# Patient Record
Sex: FEMALE | Race: WHITE | NOT HISPANIC OR LATINO | Employment: FULL TIME | URBAN - METROPOLITAN AREA
[De-identification: names, ages, dates, MRNs, and addresses within clinical notes are randomized per-mention and may not be internally consistent; named-entity substitution may affect disease eponyms.]

---

## 2017-04-05 ENCOUNTER — HOSPITAL ENCOUNTER (OUTPATIENT)
Dept: LAB | Facility: MEDICAL CENTER | Age: 37
End: 2017-04-05
Attending: INTERNAL MEDICINE
Payer: MEDICAID

## 2017-04-05 LAB
T4 FREE SERPL-MCNC: 1.01 NG/DL (ref 0.53–1.43)
TSH SERPL DL<=0.005 MIU/L-ACNC: 15.45 UIU/ML (ref 0.3–3.7)

## 2017-04-05 PROCEDURE — 84439 ASSAY OF FREE THYROXINE: CPT

## 2017-04-05 PROCEDURE — 84443 ASSAY THYROID STIM HORMONE: CPT

## 2017-04-05 PROCEDURE — 36415 COLL VENOUS BLD VENIPUNCTURE: CPT

## 2017-07-13 ENCOUNTER — HOSPITAL ENCOUNTER (OUTPATIENT)
Dept: LAB | Facility: MEDICAL CENTER | Age: 37
End: 2017-07-13
Attending: INTERNAL MEDICINE
Payer: MEDICAID

## 2017-07-13 LAB
T4 FREE SERPL-MCNC: 0.71 NG/DL (ref 0.53–1.43)
TSH SERPL DL<=0.005 MIU/L-ACNC: 26.23 UIU/ML (ref 0.3–3.7)

## 2017-07-13 PROCEDURE — 84443 ASSAY THYROID STIM HORMONE: CPT

## 2017-07-13 PROCEDURE — 84439 ASSAY OF FREE THYROXINE: CPT

## 2017-07-13 PROCEDURE — 36415 COLL VENOUS BLD VENIPUNCTURE: CPT

## 2018-07-03 ENCOUNTER — HOSPITAL ENCOUNTER (OUTPATIENT)
Dept: LAB | Facility: MEDICAL CENTER | Age: 38
End: 2018-07-03
Attending: INTERNAL MEDICINE
Payer: MEDICAID

## 2018-07-03 LAB
T4 FREE SERPL-MCNC: 0.74 NG/DL (ref 0.53–1.43)
TSH SERPL DL<=0.005 MIU/L-ACNC: 18.24 UIU/ML (ref 0.38–5.33)

## 2018-07-03 PROCEDURE — 84443 ASSAY THYROID STIM HORMONE: CPT

## 2018-07-03 PROCEDURE — 80053 COMPREHEN METABOLIC PANEL: CPT

## 2018-07-03 PROCEDURE — 36415 COLL VENOUS BLD VENIPUNCTURE: CPT

## 2018-07-03 PROCEDURE — 84439 ASSAY OF FREE THYROXINE: CPT

## 2018-07-04 LAB
ALBUMIN SERPL BCP-MCNC: 4.3 G/DL (ref 3.2–4.9)
ALBUMIN/GLOB SERPL: 1.7 G/DL
ALP SERPL-CCNC: 60 U/L (ref 30–99)
ALT SERPL-CCNC: 9 U/L (ref 2–50)
ANION GAP SERPL CALC-SCNC: 8 MMOL/L (ref 0–11.9)
AST SERPL-CCNC: 27 U/L (ref 12–45)
BILIRUB SERPL-MCNC: 0.5 MG/DL (ref 0.1–1.5)
BUN SERPL-MCNC: 11 MG/DL (ref 8–22)
CALCIUM SERPL-MCNC: 8.7 MG/DL (ref 8.5–10.5)
CHLORIDE SERPL-SCNC: 106 MMOL/L (ref 96–112)
CO2 SERPL-SCNC: 23 MMOL/L (ref 20–33)
CREAT SERPL-MCNC: 0.73 MG/DL (ref 0.5–1.4)
GLOBULIN SER CALC-MCNC: 2.5 G/DL (ref 1.9–3.5)
GLUCOSE SERPL-MCNC: 81 MG/DL (ref 65–99)
POTASSIUM SERPL-SCNC: 4.7 MMOL/L (ref 3.6–5.5)
PROT SERPL-MCNC: 6.8 G/DL (ref 6–8.2)
SODIUM SERPL-SCNC: 137 MMOL/L (ref 135–145)

## 2018-10-21 ENCOUNTER — HOSPITAL ENCOUNTER (EMERGENCY)
Facility: MEDICAL CENTER | Age: 38
End: 2018-10-21
Attending: EMERGENCY MEDICINE
Payer: MEDICAID

## 2018-10-21 VITALS
RESPIRATION RATE: 16 BRPM | WEIGHT: 236.55 LBS | HEIGHT: 62 IN | SYSTOLIC BLOOD PRESSURE: 128 MMHG | DIASTOLIC BLOOD PRESSURE: 68 MMHG | TEMPERATURE: 97.9 F | OXYGEN SATURATION: 98 % | BODY MASS INDEX: 43.53 KG/M2 | HEART RATE: 65 BPM

## 2018-10-21 DIAGNOSIS — S39.012A LUMBOSACRAL STRAIN, INITIAL ENCOUNTER: ICD-10-CM

## 2018-10-21 DIAGNOSIS — M54.50 ACUTE BILATERAL LOW BACK PAIN WITHOUT SCIATICA: ICD-10-CM

## 2018-10-21 PROCEDURE — A9270 NON-COVERED ITEM OR SERVICE: HCPCS | Performed by: EMERGENCY MEDICINE

## 2018-10-21 PROCEDURE — 700111 HCHG RX REV CODE 636 W/ 250 OVERRIDE (IP): Performed by: EMERGENCY MEDICINE

## 2018-10-21 PROCEDURE — 700102 HCHG RX REV CODE 250 W/ 637 OVERRIDE(OP): Performed by: EMERGENCY MEDICINE

## 2018-10-21 PROCEDURE — 96372 THER/PROPH/DIAG INJ SC/IM: CPT

## 2018-10-21 PROCEDURE — 99284 EMERGENCY DEPT VISIT MOD MDM: CPT

## 2018-10-21 RX ORDER — METHYLPREDNISOLONE 4 MG/1
TABLET ORAL
Qty: 1 KIT | Refills: 0 | Status: SHIPPED | OUTPATIENT
Start: 2018-10-21 | End: 2019-10-04

## 2018-10-21 RX ORDER — KETOROLAC TROMETHAMINE 30 MG/ML
30 INJECTION, SOLUTION INTRAMUSCULAR; INTRAVENOUS ONCE
Status: COMPLETED | OUTPATIENT
Start: 2018-10-21 | End: 2018-10-21

## 2018-10-21 RX ORDER — IBUPROFEN 600 MG/1
600 TABLET ORAL EVERY 6 HOURS PRN
Qty: 20 TAB | Refills: 0 | Status: SHIPPED | OUTPATIENT
Start: 2018-10-21 | End: 2019-10-04

## 2018-10-21 RX ORDER — METHYLPREDNISOLONE SODIUM SUCCINATE 125 MG/2ML
125 INJECTION, POWDER, LYOPHILIZED, FOR SOLUTION INTRAMUSCULAR; INTRAVENOUS ONCE
Status: COMPLETED | OUTPATIENT
Start: 2018-10-21 | End: 2018-10-21

## 2018-10-21 RX ORDER — METHOCARBAMOL 500 MG/1
500 TABLET, FILM COATED ORAL ONCE
Status: COMPLETED | OUTPATIENT
Start: 2018-10-21 | End: 2018-10-21

## 2018-10-21 RX ORDER — METHOCARBAMOL 500 MG/1
500 TABLET, FILM COATED ORAL EVERY 6 HOURS PRN
Qty: 20 TAB | Refills: 0 | Status: SHIPPED | OUTPATIENT
Start: 2018-10-21 | End: 2019-10-04

## 2018-10-21 RX ADMIN — METHOCARBAMOL 500 MG: 500 TABLET ORAL at 08:59

## 2018-10-21 RX ADMIN — METHYLPREDNISOLONE SODIUM SUCCINATE 125 MG: 125 INJECTION, POWDER, FOR SOLUTION INTRAMUSCULAR; INTRAVENOUS at 08:59

## 2018-10-21 RX ADMIN — KETOROLAC TROMETHAMINE 30 MG: 30 INJECTION, SOLUTION INTRAMUSCULAR at 09:00

## 2018-10-21 ASSESSMENT — PAIN SCALES - GENERAL
PAINLEVEL_OUTOF10: 7
PAINLEVEL_OUTOF10: 4

## 2018-10-21 NOTE — ED NOTES
Discharge instructions given to pt including follow up with pcp or returning if no improvement of symptoms or to return if worse. Prescription x 3 provided to pt. Questions answered by RN. Denies any new complaints. Discharged w/stable vitals and able to ambulate w/steady gait. Pt verbalized relief of pain. Instructed no driving no drinking alcohol while on prescribed muscle relaxer.

## 2018-10-21 NOTE — DISCHARGE INSTRUCTIONS
Follow-up with primary care 1-2 days for reevaluation, medication management and close blood pressure monitoring.  Follow-up for MRI as scheduled later this week.      Medrol Dosepak as directed.  Motrin every 6 hours as needed for back pain.  Robaxin every 6 hours as needed for back pain or spasm.    Slow stretching range of motion exercises recommended.  Otherwise activity as tolerated.    Return to the emergency department for persistent worsening back pain, lower extremity paresthesias or weakness, incontinence or urinary retention, fever, vomiting or other new concerns.

## 2018-10-21 NOTE — ED TRIAGE NOTES
"Pt c/o low back pain. Pt has already seen primary MD and has outpt MRI scheduled but pain is \"too much\". Pt ambulatory.   "

## 2018-10-21 NOTE — ED PROVIDER NOTES
"ED Provider Note    CHIEF COMPLAINT  Chief Complaint   Patient presents with   • Low Back Pain       HPI  Lynette Joya is a 38 y.o. female who ambulates to the emergency department for low back pain.  Patient describes progressive low back pain over 2 weeks, now \"pressure\" even at rest but worse, sharp, tight with movement and activity.  No extremity weakness or paresthesias.  No incontinence or urinary retention.  No abdominal pain or flank pain.  No fever chills.  No nausea or vomiting.  No diarrhea or bloody stools.  No IV drug use.  No history of similar symptoms.  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    Patient did see her primary care physician earlier this week, tramadol given for discomfort although this patient states this is been ineffective but admittedly has not taken many of them.  Patient states she did get Toradol in the office but the medication wore off by the next morning.  She does have an appointment for MRI this coming Friday.    REVIEW OF SYSTEMS  See HPI for further details. All other systems are negative.     PAST MEDICAL HISTORY   has a past medical history of Anesthesia; Dental disorder; Depression; Graves disease (12/11/2015); Headache(784.0); Heart burn; Heart murmur; Hypertension; Increased BMI; Migraine; Ovarian cyst (2010); Pre-eclampsia; Sleep apnea; and Snoring.    SOCIAL HISTORY  Social History     Social History Main Topics   • Smoking status: Current Some Day Smoker     Years: 2.00     Types: Cigarettes   • Smokeless tobacco: Never Used      Comment: currently smoking 2-3 cigarettes per week.   • Alcohol use Yes      Comment: socially   • Drug use: No   • Sexual activity: Yes     Partners: Male     Birth control/ protection: Pill      Comment: Unplanned pregancy.       SURGICAL HISTORY   has a past surgical history that includes d & c (2006); dilation and evacuation (8/21/2013); " "cryocautery of cervix (2002); hysteroscopy essure coil (12/3/2013); and thyroidectomy total (12/11/2015).    CURRENT MEDICATIONS  Home Medications    **Home medications have not yet been reviewed for this encounter**         ALLERGIES  Allergies   Allergen Reactions   • Nkda [No Known Drug Allergy]        PHYSICAL EXAM  VITAL SIGNS: /68   Pulse 75   Temp 36.6 °C (97.9 °F) (Temporal)   Resp 16   Ht 1.575 m (5' 2\")   Wt 107.3 kg (236 lb 8.9 oz)   SpO2 98%   BMI 43.27 kg/m²   Pulse ox interpretation: I interpret this pulse ox as normal.  Constitutional: Alert.  Mild distress secondary discomfort per  HENT: Normocephalic, atraumatic. Bilateral external ears normal, Nose normal. Moist mucous membranes.    Eyes: Conjunctiva normal.   Neck: Normal range of motion, Supple  Lymphatic: No lymphadenopathy noted.   Cardiovascular: Normal peripheral perfusion.  Thorax & Lungs: Nonlabored respirations.  Abdomen: Soft, non-distended, non-tender to palpation. No palpable or pulsatile masses.  No CVA tenderness to percussion per  Skin: Warm, Dry, No erythema, No rash.   Musculoskeletal: Good range of motion in all major joints.  Neurologic: Alert and oriented x4.  Speech clear and cohesive.  5 out of 5 strength in bilateral lower extremities, hip, knee, ankle and toe flexion extension intact.  Straight leg raise intact but with discomfort bilaterally.  2+ patellar DTR.  2+ DP.  Sensation intact light touch, no saddle anesthesia.  Patient bears weight and ambulate independently.  Psychiatric: Affect normal, Judgment normal, Mood normal.       COURSE & MEDICAL DECISION MAKING  Nursing notes and vital signs were reviewed. (See chart for details)  The patients records were reviewed, history was obtained from the patient;     ED evaluation was consistent with lumbosacral strain, no evidence for radiculopathy at this time.  No history or clinical evidence to suggest cauda equina, epidural abscess or other spinal cord " compression syndrome.  Patient did get significant relief after Toradol, Solu-Medrol, Robaxin in the emergency department.  Vital signs are stable without fever tachycardia.  Patient ambulates independently.    Patient is stable for discharge at this time, anticipatory guidance provided, Medrol Dosepak as directed, ibuprofen and Robaxin as needed for pain or spasm, close follow-up is encouraged with primary care this week, MRI is scheduled, and strict ED return instructions have been detailed. Patient is agreeable to the disposition and plan.    Patient's blood pressure was elevated in the emergency department, and has been referred to primary care for close monitoring.      FINAL IMPRESSION  (M54.5) Acute bilateral low back pain without sciatica  (S39.012A) Lumbosacral strain, initial encounter      Electronically signed by: Sarah Cristobal, 10/21/2018 10:02 AM      This dictation was created using voice recognition software. The accuracy of the dictation is limited to the abilities of the software. I expect there may be some errors of grammar and possibly content. The nursing notes were reviewed and certain aspects of this information were incorporated into this note.

## 2018-10-21 NOTE — ED NOTES
Ambulated to room w/steady gait. C/o pain in her lower back/neck describes as shooting pain all the way down to both her lower extremities. Denies  Numbness/tingling denies bowel/bladder incontinence. Call light within reach. Instructed to notify staff for any assistance.

## 2019-07-08 ENCOUNTER — APPOINTMENT (OUTPATIENT)
Dept: RADIOLOGY | Facility: MEDICAL CENTER | Age: 39
End: 2019-07-08
Attending: EMERGENCY MEDICINE
Payer: MEDICAID

## 2019-07-08 ENCOUNTER — HOSPITAL ENCOUNTER (EMERGENCY)
Facility: MEDICAL CENTER | Age: 39
End: 2019-07-08
Attending: EMERGENCY MEDICINE
Payer: MEDICAID

## 2019-07-08 VITALS
OXYGEN SATURATION: 93 % | HEART RATE: 86 BPM | RESPIRATION RATE: 16 BRPM | BODY MASS INDEX: 46.49 KG/M2 | SYSTOLIC BLOOD PRESSURE: 134 MMHG | TEMPERATURE: 98.1 F | HEIGHT: 62 IN | WEIGHT: 252.65 LBS | DIASTOLIC BLOOD PRESSURE: 85 MMHG

## 2019-07-08 DIAGNOSIS — S93.401A SPRAIN OF RIGHT ANKLE, UNSPECIFIED LIGAMENT, INITIAL ENCOUNTER: ICD-10-CM

## 2019-07-08 PROCEDURE — 700102 HCHG RX REV CODE 250 W/ 637 OVERRIDE(OP): Performed by: EMERGENCY MEDICINE

## 2019-07-08 PROCEDURE — 99284 EMERGENCY DEPT VISIT MOD MDM: CPT

## 2019-07-08 PROCEDURE — 73610 X-RAY EXAM OF ANKLE: CPT | Mod: RT

## 2019-07-08 PROCEDURE — A9270 NON-COVERED ITEM OR SERVICE: HCPCS | Performed by: EMERGENCY MEDICINE

## 2019-07-08 PROCEDURE — 302874 HCHG BANDAGE ACE 2 OR 3""

## 2019-07-08 RX ORDER — NAPROXEN 500 MG/1
500 TABLET ORAL 2 TIMES DAILY WITH MEALS
Qty: 60 TAB | Refills: 0 | Status: SHIPPED | OUTPATIENT
Start: 2019-07-08 | End: 2019-10-04

## 2019-07-08 RX ORDER — NAPROXEN 250 MG/1
500 TABLET ORAL ONCE
Status: COMPLETED | OUTPATIENT
Start: 2019-07-08 | End: 2019-07-08

## 2019-07-08 RX ADMIN — NAPROXEN 500 MG: 250 TABLET ORAL at 09:06

## 2019-07-08 NOTE — ED NOTES
"D/C instn given Rest Ice NSAIDS Pt has her own crutches D/C via W/C Stable improved condn \" ankle feels better in wrap CSM intact distal to ace  To F/U with ortho prn  "

## 2019-07-08 NOTE — ED PROVIDER NOTES
"ED Provider Note    CHIEF COMPLAINT  Chief Complaint   Patient presents with   • Ankle Pain     Right lateral ankle, atraumatic. Started yesterday, pt reports the pain kept her up all night. CMS intact. Difficulty bearing weight        HPI  Lynette Joya is a 39 y.o. female who presents to the ED complaining of right ankle pain.  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  Patient denies paresthesias nausea vomiting calf pain or any other symptoms presents for evaluation.    REVIEW OF SYSTEMS  See HPI for further details. All other systems are negative.     PAST MEDICAL HISTORY  Past Medical History:   Diagnosis Date   • Anesthesia     PONV and or crying   • Dental disorder    • Depression     \"post partum\"   • Graves disease 12/11/2015   • Headache(784.0)     onset 2 yrs ago, severe (migraines) takes ASA and lies down to resolve.     • Heart burn    • Heart murmur    • Hypertension     Stopped taking meds 1.5 month ago.   • Increased BMI    • Migraine    • Ovarian cyst 2010    right side   • Pre-eclampsia     Dx'd with pree clapmsia during last 3 pregnancies.   • Sleep apnea     had sleep study done, treatment recommended but not \"followed through\"   • Snoring        FAMILY HISTORY  Family History   Problem Relation Age of Onset   • Cancer Paternal Grandfather    • Diabetes Paternal Grandfather    • Hypertension Paternal Grandfather    • Stroke Unknown      Patient's family history has been discussed and is been found to be noncontributory to his present illness  SOCIAL HISTORY  Social History     Social History   • Marital status:      Spouse name: N/A   • Number of children: N/A   • Years of education: N/A     Social History Main Topics   • Smoking " status: Current Some Day Smoker     Years: 2.00     Types: Cigarettes   • Smokeless tobacco: Never Used      Comment: currently smoking 2-3 cigarettes per week.   • Alcohol use Yes      Comment: socially   • Drug use: No   • Sexual activity: Yes     Partners: Male     Birth control/ protection: Pill      Comment: Unplanned pregancy.     Other Topics Concern   • Not on file     Social History Narrative   • No narrative on file              SURGICAL HISTORY  Past Surgical History:   Procedure Laterality Date   • THYROIDECTOMY TOTAL  12/11/2015    Procedure: THYROIDECTOMY subtotal poss  TOTAL  , bilateral NIMS recurrent laryngeal; nerve monitor ;  Surgeon: Mariah Gee M.D.;  Location: SURGERY [facility];  Service:    • HYSTEROSCOPY ESSURE COIL  12/3/2013    Performed by Yovana Damon M.D. at SURGERY [facility]   • DILATION AND EVACUATION  8/21/2013    Performed by Yovana Damon M.D. at SURGERY SAME DAY [facility]   • D & C  2006   • PB CRYOCAUTERY OF CERVIX  2002       CURRENT MEDICATIONS   Home Medications     Reviewed by Adriana Chacon RCarrilloNCarrillo (Registered Nurse) on 07/08/19 at 0813  Med List Status: Not Addressed   Medication Last Dose Status   Calcium Carbonate Antacid (TUMS ULTRA 1000) 1000 MG Chew Tab  Active   fluoxetine (PROZAC) 40 MG capsule  Active   hydrocodone-acetaminophen (NORCO) 5-325 MG Tab per tablet  Active   ibuprofen (MOTRIN) 600 MG Tab  Active   levothyroxine (SYNTHROID) 125 MCG Tab  Active   methocarbamol (ROBAXIN) 500 MG Tab  Active   MethylPREDNISolone (MEDROL DOSEPAK) 4 MG Tablet Therapy Pack  Active   metoprolol SR (TOPROL XL) 25 MG TB24  Active   naproxen (NAPROSYN) 500 MG Tab  Active   potassium chloride CR (K-DUR) 10 MEQ tablet  Active   tramadol (ULTRAM) 50 MG Tab  Active                ALLERGIES   Allergies   Allergen Reactions   • Nkda [No Known Drug Allergy]        PHYSICAL EXAM  VITAL SIGNS: /85   Pulse 86   Temp 36.7 °C (98.1 °F) (Temporal)   Resp 16   Ht 1.575  "m (5' 2\")   Wt 114.6 kg (252 lb 10.4 oz)   SpO2 93%   BMI 46.21 kg/m²    Pulse Ox interpretation nonhypoxic    Constitutional: Well developed, Well nourished, No acute distress, Non-toxic appearance.   Skin: Warm, Dry, No erythema,   Musculoskeletal: Intact distal pulses, no clubbing, no cyanosis, no edema patient is tender about the lateral malleolus as well as the peroneal tendon region of the posterior malleolor region.  The ankle itself there is no edema there is no other tenderness in the ankle region.  There is no deformities distal pulses are grossly intact no significant bony tenderness.  Neurologic: Alert & oriented x 3, Normal motor function, Normal sensory function, No focal deficits noted.     RADIOLOGY/PROCEDURES  DX-ANKLE 3+ VIEWS RIGHT   Final Result      Soft tissue swelling without acute or subacute bony abnormality identified            COURSE & MEDICAL DECISION MAKING  Pertinent Labs & Imaging studies reviewed. (See chart for details)  Patient presents for evaluation.  Clinically I do feel that this is a peroneus muscular strain and ankle sprain to the right ankle.  She does not have any actual joint tenderness or other bony tenderness.  X-ray has the above findings.  At this point I recommended range of motion exercises crutches for 1 week limited walking and anti-inflammatory treatment.  Patient is to follow-up with orthopedics in 1 week if there is any continued symptoms return as needed.    FINAL IMPRESSION  1. Sprain of right ankle, unspecified ligament, initial encounter           The patient will return for new or worsening symptoms and is stable at the time of discharge.    The patient is referred to a primary physician for blood pressure management, diabetic screening, and for all other preventative health concerns.        DISPOSITION:  Patient will be discharged home in stable condition.    FOLLOW UP:  Kannan Ragland M.D.  [address] [place] " [id number]  [phone]    Schedule an appointment as soon as possible for a visit in 1 week  For further evaluation, Return if any symptoms worsen      OUTPATIENT MEDICATIONS:  Discharge Medication List as of 7/8/2019  9:22 AM      START taking these medications    Details   naproxen (NAPROSYN) 500 MG Tab Take 1 Tab by mouth 2 times a day, with meals., Disp-60 Tab, R-0, Print Rx Paper                 Electronically signed by: Polo Ram, 7/8/2019 8:24 AM

## 2019-10-04 ENCOUNTER — APPOINTMENT (OUTPATIENT)
Dept: RADIOLOGY | Facility: MEDICAL CENTER | Age: 39
End: 2019-10-04
Attending: EMERGENCY MEDICINE
Payer: MEDICAID

## 2019-10-04 ENCOUNTER — HOSPITAL ENCOUNTER (EMERGENCY)
Facility: MEDICAL CENTER | Age: 39
End: 2019-10-04
Attending: EMERGENCY MEDICINE
Payer: MEDICAID

## 2019-10-04 VITALS
OXYGEN SATURATION: 97 % | RESPIRATION RATE: 20 BRPM | SYSTOLIC BLOOD PRESSURE: 129 MMHG | WEIGHT: 261.91 LBS | HEIGHT: 62 IN | HEART RATE: 68 BPM | DIASTOLIC BLOOD PRESSURE: 67 MMHG | TEMPERATURE: 96.3 F | BODY MASS INDEX: 48.2 KG/M2

## 2019-10-04 DIAGNOSIS — L02.211 ABSCESS OF ABDOMINAL WALL: ICD-10-CM

## 2019-10-04 DIAGNOSIS — L02.91 ABSCESS: ICD-10-CM

## 2019-10-04 LAB
ANION GAP SERPL CALC-SCNC: 13 MMOL/L (ref 0–11.9)
BASOPHILS # BLD AUTO: 0.5 % (ref 0–1.8)
BASOPHILS # BLD: 0.06 K/UL (ref 0–0.12)
BUN SERPL-MCNC: 9 MG/DL (ref 8–22)
CALCIUM SERPL-MCNC: 9 MG/DL (ref 8.4–10.2)
CHLORIDE SERPL-SCNC: 103 MMOL/L (ref 96–112)
CO2 SERPL-SCNC: 22 MMOL/L (ref 20–33)
CREAT SERPL-MCNC: 0.87 MG/DL (ref 0.5–1.4)
EOSINOPHIL # BLD AUTO: 0.22 K/UL (ref 0–0.51)
EOSINOPHIL NFR BLD: 1.8 % (ref 0–6.9)
ERYTHROCYTE [DISTWIDTH] IN BLOOD BY AUTOMATED COUNT: 42.5 FL (ref 35.9–50)
GLUCOSE SERPL-MCNC: 89 MG/DL (ref 65–99)
HCG SERPL QL: NEGATIVE
HCT VFR BLD AUTO: 40.7 % (ref 37–47)
HGB BLD-MCNC: 13.8 G/DL (ref 12–16)
IMM GRANULOCYTES # BLD AUTO: 0.04 K/UL (ref 0–0.11)
IMM GRANULOCYTES NFR BLD AUTO: 0.3 % (ref 0–0.9)
LYMPHOCYTES # BLD AUTO: 2.83 K/UL (ref 1–4.8)
LYMPHOCYTES NFR BLD: 23.7 % (ref 22–41)
MCH RBC QN AUTO: 30.5 PG (ref 27–33)
MCHC RBC AUTO-ENTMCNC: 33.9 G/DL (ref 33.6–35)
MCV RBC AUTO: 90 FL (ref 81.4–97.8)
MONOCYTES # BLD AUTO: 0.49 K/UL (ref 0–0.85)
MONOCYTES NFR BLD AUTO: 4.1 % (ref 0–13.4)
NEUTROPHILS # BLD AUTO: 8.3 K/UL (ref 2–7.15)
NEUTROPHILS NFR BLD: 69.6 % (ref 44–72)
NRBC # BLD AUTO: 0 K/UL
NRBC BLD-RTO: 0 /100 WBC
PLATELET # BLD AUTO: 217 K/UL (ref 164–446)
PMV BLD AUTO: 9.8 FL (ref 9–12.9)
POTASSIUM SERPL-SCNC: 4.3 MMOL/L (ref 3.6–5.5)
RBC # BLD AUTO: 4.52 M/UL (ref 4.2–5.4)
SODIUM SERPL-SCNC: 138 MMOL/L (ref 135–145)
WBC # BLD AUTO: 11.9 K/UL (ref 4.8–10.8)

## 2019-10-04 PROCEDURE — 99284 EMERGENCY DEPT VISIT MOD MDM: CPT

## 2019-10-04 PROCEDURE — 303977 HCHG I & D

## 2019-10-04 PROCEDURE — 700117 HCHG RX CONTRAST REV CODE 255: Performed by: EMERGENCY MEDICINE

## 2019-10-04 PROCEDURE — 700111 HCHG RX REV CODE 636 W/ 250 OVERRIDE (IP)

## 2019-10-04 PROCEDURE — 96365 THER/PROPH/DIAG IV INF INIT: CPT | Mod: XU

## 2019-10-04 PROCEDURE — 85025 COMPLETE CBC W/AUTO DIFF WBC: CPT

## 2019-10-04 PROCEDURE — 74177 CT ABD & PELVIS W/CONTRAST: CPT

## 2019-10-04 PROCEDURE — 84703 CHORIONIC GONADOTROPIN ASSAY: CPT

## 2019-10-04 PROCEDURE — 80048 BASIC METABOLIC PNL TOTAL CA: CPT

## 2019-10-04 PROCEDURE — 96375 TX/PRO/DX INJ NEW DRUG ADDON: CPT | Mod: XU

## 2019-10-04 PROCEDURE — 700101 HCHG RX REV CODE 250: Performed by: EMERGENCY MEDICINE

## 2019-10-04 RX ORDER — IBUPROFEN 200 MG
400 TABLET ORAL
COMMUNITY

## 2019-10-04 RX ORDER — CLINDAMYCIN PHOSPHATE 600 MG/50ML
600 INJECTION, SOLUTION INTRAVENOUS ONCE
Status: COMPLETED | OUTPATIENT
Start: 2019-10-04 | End: 2019-10-04

## 2019-10-04 RX ORDER — FLUOXETINE HYDROCHLORIDE 40 MG/1
40 CAPSULE ORAL EVERY MORNING
COMMUNITY

## 2019-10-04 RX ORDER — KETOROLAC TROMETHAMINE 30 MG/ML
INJECTION, SOLUTION INTRAMUSCULAR; INTRAVENOUS
Status: COMPLETED
Start: 2019-10-04 | End: 2019-10-04

## 2019-10-04 RX ORDER — MELOXICAM 7.5 MG/1
7.5 TABLET ORAL EVERY MORNING
COMMUNITY
Start: 2019-05-13

## 2019-10-04 RX ORDER — NAPROXEN 500 MG/1
500 TABLET ORAL 2 TIMES DAILY WITH MEALS
Qty: 20 TAB | Refills: 0 | Status: SHIPPED | OUTPATIENT
Start: 2019-10-04

## 2019-10-04 RX ORDER — LOSARTAN POTASSIUM 25 MG/1
25 TABLET ORAL EVERY MORNING
COMMUNITY
Start: 2019-05-13

## 2019-10-04 RX ORDER — CLINDAMYCIN HYDROCHLORIDE 300 MG/1
300 CAPSULE ORAL 4 TIMES DAILY
Qty: 40 CAP | Refills: 0 | Status: SHIPPED | OUTPATIENT
Start: 2019-10-04

## 2019-10-04 RX ORDER — KETOROLAC TROMETHAMINE 30 MG/ML
30 INJECTION, SOLUTION INTRAMUSCULAR; INTRAVENOUS ONCE
Status: COMPLETED | OUTPATIENT
Start: 2019-10-04 | End: 2019-10-04

## 2019-10-04 RX ORDER — LEVOTHYROXINE SODIUM 0.12 MG/1
125 TABLET ORAL EVERY MORNING
COMMUNITY

## 2019-10-04 RX ADMIN — CLINDAMYCIN IN 5 PERCENT DEXTROSE 600 MG: 12 INJECTION, SOLUTION INTRAVENOUS at 10:51

## 2019-10-04 RX ADMIN — KETOROLAC TROMETHAMINE 30 MG: 30 INJECTION, SOLUTION INTRAMUSCULAR; INTRAVENOUS at 10:51

## 2019-10-04 RX ADMIN — IOHEXOL 100 ML: 350 INJECTION, SOLUTION INTRAVENOUS at 11:40

## 2019-10-04 NOTE — ED TRIAGE NOTES
Pt reports noticing a small purple spot in her groin area that was mildly uncomfortable 2 days ago.  Pt states the purple spot has since gotten bigger and more uncomfortable.

## 2019-10-04 NOTE — ED NOTES
MEDICATION RECONCILIATION HAS BEEN COMPLETED BY INTERVIEWING PATIENT WITH RX BOTTLES AND  CONSENT TO DO SO WITH FAMILY/VISITORS IN THE ROOM.   ALLERGIES WERE REVIEWED   NO ORAL ANTIBIOTICS WITHIN THE PAST 14 DAYS  PT HOME PHARMACY: [facility]      PT REPORTS THAT SHE HAS NOT TAKEN HER SYNTHROID IN ABOUT 2 WEEKS BECAUSE SHE RAN OUT OF MEDICATION.

## 2019-10-04 NOTE — DISCHARGE INSTRUCTIONS
Keep a dressing over this perhaps a Band-Aid because this will lose for couple of days.  Return to the emergency room if the redness passes the marked lines.

## 2019-10-04 NOTE — ED PROVIDER NOTES
ED Provider Note    ED Provider    Means of arrival: Private vehicle  History obtained from: Patient  History limited by: None    CHIEF COMPLAINT  Chief Complaint   Patient presents with   • Abscess       HPI  Lynette Joya is a 39 y.o. female who presents complaints of left lower abdominal pain and swelling.  She has a infection there that she does not know what started it but is been progressively worsening.  She has been using warm soaks without effect.  She has had some chills no documented fever no shortness of breath no nausea no vomiting.  No history of abscess in the past.    REVIEW OF SYSTEMS  See HPI for further details. All other systems are negative.     PAST MEDICAL HISTORY   has a past medical history of Anesthesia, Dental disorder, Depression, Graves disease (12/11/2015), Headache(784.0), Heart burn, Heart murmur, Hypertension, Increased BMI, Migraine, Ovarian cyst (2010), Pre-eclampsia, Sleep apnea, and Snoring.    SOCIAL HISTORY  Social History     Tobacco Use   • Smoking status: Current Some Day Smoker     Years: 2.00     Types: Cigarettes   • Smokeless tobacco: Never Used   • Tobacco comment: currently smoking 2-3 cigarettes per week.   Substance and Sexual Activity   • Alcohol use: Yes     Comment: socially   • Drug use: No   • Sexual activity: Yes     Partners: Male     Birth control/protection: Pill     Comment: Unplanned pregancy.       SURGICAL HISTORY   has a past surgical history that includes d & c (2006); dilation and evacuation (8/21/2013); cryocautery of cervix (2002); hysteroscopy essure coil (12/3/2013); and thyroidectomy total (12/11/2015).    CURRENT MEDICATIONS  Home Medications     Reviewed by Emiliana Willis (Pharmacy Tech) on 10/04/19 at 1054  Med List Status: Complete   Medication Last Dose Status   fluoxetine (PROZAC) 40 MG capsule 10/3/2019 Active   ibuprofen (MOTRIN) 200 MG Tab <2weeks Active   levothyroxine (SYNTHROID) 125 MCG Tab <2weeks Active   losartan (COZAAR)  "25 MG Tab 10/3/2019 Active   meloxicam (MOBIC) 7.5 MG Tab 10/3/2019 Active                ALLERGIES  No Known Allergies    PHYSICAL EXAM  VITAL SIGNS: /67   Pulse 68   Temp (!) 35.7 °C (96.3 °F) (Temporal)   Resp 20   Ht 1.575 m (5' 2\")   Wt 118.8 kg (261 lb 14.5 oz)   LMP 10/01/2019 (Exact Date)   SpO2 97%   BMI 47.90 kg/m²   Constitutional: Alert in no apparent distress.  HENT: No signs of trauma, Mucous membranes are moist   Eyes:  Conjunctiva normal, Non-icteric.   Neck: Normal range of motion, No tenderness, Supple,  Lymphatic: No lymphadenopathy noted.   Cardiovascular: Regular rate and rhythm, no murmurs.   Thorax & Lungs: Normal breath sounds, No respiratory distress, No wheezing, No chest tenderness.   Abdomen: Bowel sounds normal, Soft, No tenderness, No masses, No pulsatile masses. No peritoneal signs.  Patient has a cutaneous nodule that is indurated on the left lower abdomen.  There is a central darkened crusted area.  There is surrounding erythema and swelling going through the mons pubis.  Skin: Warm, Dry,Normal color  Back: No bony tenderness, No CVA tenderness.   Extremities:No edema, No tenderness, No cyanosis,    Musculoskeletal: Good range of motion in all major joints. No tenderness to palpation or major deformities noted.   Neurologic: Alert ,Oriented x4, Normal motor function, Normal sensory function, No focal deficits noted.   Psychiatric: Affect normal, Judgment normal, Mood normal.       MEDICAL DECISION MAKING  This is a 39 y.o. female who presents what appears to be an abscess of the left lower quadrant.  There is no fluctuance but there is surrounding cellulitis due to the area of this a CT was done to evaluate for deep infection, and deep abscess.  The patient will be started on on parenteral antibiotics and pain medication at this time.    DIAGNOSTIC STUDIES / PROCEDURES    EKG      LABS  Results for orders placed or performed during the hospital encounter of 10/04/19 "   CBC WITH DIFFERENTIAL   Result Value Ref Range    WBC 11.9 (H) 4.8 - 10.8 K/uL    RBC 4.52 4.20 - 5.40 M/uL    Hemoglobin 13.8 12.0 - 16.0 g/dL    Hematocrit 40.7 37.0 - 47.0 %    MCV 90.0 81.4 - 97.8 fL    MCH 30.5 27.0 - 33.0 pg    MCHC 33.9 33.6 - 35.0 g/dL    RDW 42.5 35.9 - 50.0 fL    Platelet Count 217 164 - 446 K/uL    MPV 9.8 9.0 - 12.9 fL    Neutrophils-Polys 69.60 44.00 - 72.00 %    Lymphocytes 23.70 22.00 - 41.00 %    Monocytes 4.10 0.00 - 13.40 %    Eosinophils 1.80 0.00 - 6.90 %    Basophils 0.50 0.00 - 1.80 %    Immature Granulocytes 0.30 0.00 - 0.90 %    Nucleated RBC 0.00 /100 WBC    Neutrophils (Absolute) 8.30 (H) 2.00 - 7.15 K/uL    Lymphs (Absolute) 2.83 1.00 - 4.80 K/uL    Monos (Absolute) 0.49 0.00 - 0.85 K/uL    Eos (Absolute) 0.22 0.00 - 0.51 K/uL    Baso (Absolute) 0.06 0.00 - 0.12 K/uL    Immature Granulocytes (abs) 0.04 0.00 - 0.11 K/uL    NRBC (Absolute) 0.00 K/uL   BASIC METABOLIC PANEL   Result Value Ref Range    Sodium 138 135 - 145 mmol/L    Potassium 4.3 3.6 - 5.5 mmol/L    Chloride 103 96 - 112 mmol/L    Co2 22 20 - 33 mmol/L    Glucose 89 65 - 99 mg/dL    Bun 9 8 - 22 mg/dL    Creatinine 0.87 0.50 - 1.40 mg/dL    Calcium 9.0 8.4 - 10.2 mg/dL    Anion Gap 13.0 (H) 0.0 - 11.9   HCG QUAL SERUM   Result Value Ref Range    Beta-Hcg Qualitative Serum Negative Negative   ESTIMATED GFR   Result Value Ref Range    GFR If African American >60 >60 mL/min/1.73 m 2    GFR If Non African American >60 >60 mL/min/1.73 m 2         RADIOLOGY  CT-ABDOMEN-PELVIS WITH   Final Result      1.  There is mild subcutaneous edema and/or cellulitis involving the lower anterior abdominal wall and perineum. There is no underlying fluid collection or abscess.   2.  Uterus and adnexal regions are unremarkable with bilateral Essure implants.          COURSE  Pertinent Labs & Imaging studies reviewed. (See chart for details)    10:26 AM - Patient seen and examined at bedside. Discussed plan of care,    Procedure  note I&D abscess left abdomen  Verbal consent was obtained  Prepped with alcohol  18-gauge needle used to incise skin  Small amount of purulent drainage  Probed to break up loculations  Tolerated well        FINAL IMPRESSION  1. Abscess    2. Abscess of abdominal wall

## 2020-01-09 ENCOUNTER — HOSPITAL ENCOUNTER (OUTPATIENT)
Dept: LAB | Facility: MEDICAL CENTER | Age: 40
End: 2020-01-09
Attending: FAMILY MEDICINE
Payer: MEDICAID

## 2020-01-09 PROCEDURE — 84443 ASSAY THYROID STIM HORMONE: CPT

## 2020-01-09 PROCEDURE — 36415 COLL VENOUS BLD VENIPUNCTURE: CPT

## 2020-01-10 LAB — TSH SERPL DL<=0.005 MIU/L-ACNC: 39.69 UIU/ML (ref 0.38–5.33)

## 2020-12-16 ENCOUNTER — APPOINTMENT (OUTPATIENT)
Dept: TELEHEALTH | Facility: TELEMEDICINE | Age: 40
End: 2020-12-16
Payer: COMMERCIAL